# Patient Record
Sex: MALE | Race: WHITE | Employment: UNEMPLOYED | URBAN - METROPOLITAN AREA
[De-identification: names, ages, dates, MRNs, and addresses within clinical notes are randomized per-mention and may not be internally consistent; named-entity substitution may affect disease eponyms.]

---

## 2017-08-21 PROBLEM — R41.82 ALTERED MENTAL STATE: Status: ACTIVE | Noted: 2017-08-20

## 2017-08-21 PROBLEM — F10.21 H/O ALCOHOL DEPENDENCE (HCC): Status: ACTIVE | Noted: 2017-08-20

## 2017-08-21 PROBLEM — T50.901A POLYSUBSTANCE OVERDOSE: Status: ACTIVE | Noted: 2017-08-20

## 2017-08-21 PROBLEM — R79.89 ELEVATED LFTS: Status: ACTIVE | Noted: 2017-08-21

## 2017-08-21 PROBLEM — D69.6 THROMBOCYTOPENIA (HCC): Status: ACTIVE | Noted: 2017-08-20

## 2019-03-22 ENCOUNTER — HOSPITAL ENCOUNTER (OUTPATIENT)
Age: 59
Discharge: HOME | End: 2019-03-24

## 2019-03-22 PROCEDURE — 88305 TISSUE EXAM BY PATHOLOGIST: CPT

## 2019-03-22 PROCEDURE — 87081 CULTURE SCREEN ONLY: CPT

## 2019-03-23 LAB — CLOTEST: NORMAL

## 2019-06-03 ENCOUNTER — OFFICE VISIT (OUTPATIENT)
Dept: PODIATRY | Age: 59
End: 2019-06-03
Payer: MEDICAID

## 2019-06-03 VITALS
TEMPERATURE: 97.8 F | SYSTOLIC BLOOD PRESSURE: 123 MMHG | DIASTOLIC BLOOD PRESSURE: 83 MMHG | WEIGHT: 147 LBS | HEIGHT: 68 IN | BODY MASS INDEX: 22.28 KG/M2

## 2019-06-03 DIAGNOSIS — M79.671 CHRONIC PAIN IN RIGHT FOOT: ICD-10-CM

## 2019-06-03 DIAGNOSIS — M89.8X7 EXOSTOSIS OF BONE OF FOOT: Primary | ICD-10-CM

## 2019-06-03 DIAGNOSIS — M19.079 ARTHRITIS OF FOOT: ICD-10-CM

## 2019-06-03 DIAGNOSIS — M79.672 CHRONIC PAIN IN LEFT FOOT: ICD-10-CM

## 2019-06-03 DIAGNOSIS — G89.29 CHRONIC PAIN IN LEFT FOOT: ICD-10-CM

## 2019-06-03 DIAGNOSIS — G89.29 CHRONIC PAIN IN RIGHT FOOT: ICD-10-CM

## 2019-06-03 PROCEDURE — 20600 DRAIN/INJ JOINT/BURSA W/O US: CPT | Performed by: PODIATRIST

## 2019-06-03 RX ORDER — SOFOSBUVIR, VELPATASVIR, AND VOXILAPREVIR 400; 100; 100 MG/1; MG/1; MG/1
TABLET, FILM COATED ORAL
COMMUNITY
Start: 2019-05-21

## 2019-06-03 RX ORDER — BETAMETHASONE SODIUM PHOSPHATE AND BETAMETHASONE ACETATE 3; 3 MG/ML; MG/ML
4 INJECTION, SUSPENSION INTRA-ARTICULAR; INTRALESIONAL; INTRAMUSCULAR; SOFT TISSUE ONCE
Status: COMPLETED | OUTPATIENT
Start: 2019-06-03 | End: 2019-06-03

## 2019-06-03 RX ORDER — BUPIVACAINE HYDROCHLORIDE 5 MG/ML
1 INJECTION, SOLUTION PERINEURAL ONCE
Status: COMPLETED | OUTPATIENT
Start: 2019-06-03 | End: 2019-06-03

## 2019-06-03 RX ORDER — RIBAVIRIN 200 MG/1
TABLET, FILM COATED ORAL
COMMUNITY
Start: 2019-05-21

## 2019-06-03 RX ADMIN — BUPIVACAINE HYDROCHLORIDE 5 MG: 5 INJECTION, SOLUTION PERINEURAL at 09:30

## 2019-06-03 RX ADMIN — BUPIVACAINE HYDROCHLORIDE 5 MG: 5 INJECTION, SOLUTION PERINEURAL at 09:31

## 2019-06-03 RX ADMIN — BETAMETHASONE SODIUM PHOSPHATE AND BETAMETHASONE ACETATE 4 MG: 3; 3 INJECTION, SUSPENSION INTRA-ARTICULAR; INTRALESIONAL; INTRAMUSCULAR; SOFT TISSUE at 09:30

## 2019-06-03 RX ADMIN — BETAMETHASONE SODIUM PHOSPHATE AND BETAMETHASONE ACETATE 4 MG: 3; 3 INJECTION, SUSPENSION INTRA-ARTICULAR; INTRALESIONAL; INTRAMUSCULAR; SOFT TISSUE at 09:29

## 2019-06-03 NOTE — PROGRESS NOTES
6/3/19  Valerie Manning : 1960 Sex: male  Age: 63 y.o. Patient was referred by Lavelle Ch DO    Chief Complaint   Patient presents with    Foot Pain     pt was last seen in 2019 presents today for injections b/l       HPI: Patient is seen for bilateral foot pain severe arthritic changes are increasing pain has been increasing the only thing that helps or cortisone injections    ROS:  Const: Denies constitutional symptoms  Musculo: Denies symptoms other than stated above  Skin: Denies symptoms other than stated above       Current Outpatient Medications:     diclofenac (VOLTAREN) 50 MG EC tablet, , Disp: , Rfl:     ribavirin (COPEGUS) 200 MG tablet, , Disp: , Rfl:     VOSEVI 400-100-100 MG TABS, , Disp: , Rfl:   No Known Allergies    No past medical history on file. Past Surgical History:   Procedure Laterality Date    FOOT SURGERY       No family history on file.   Social History     Socioeconomic History    Marital status: Single     Spouse name: Not on file    Number of children: Not on file    Years of education: Not on file    Highest education level: Not on file   Occupational History    Not on file   Social Needs    Financial resource strain: Not on file    Food insecurity:     Worry: Not on file     Inability: Not on file    Transportation needs:     Medical: Not on file     Non-medical: Not on file   Tobacco Use    Smoking status: Heavy Tobacco Smoker     Packs/day: 1.50     Types: Cigarettes    Smokeless tobacco: Never Used   Substance and Sexual Activity    Alcohol use: No    Drug use: Yes     Types: IV, Opiates , Marijuana     Comment: last used 3 weeks ago    Sexual activity: Not on file   Lifestyle    Physical activity:     Days per week: Not on file     Minutes per session: Not on file    Stress: Not on file   Relationships    Social connections:     Talks on phone: Not on file     Gets together: Not on file     Attends Scientologist service: Not on file Active member of club or organization: Not on file     Attends meetings of clubs or organizations: Not on file     Relationship status: Not on file    Intimate partner violence:     Fear of current or ex partner: Not on file     Emotionally abused: Not on file     Physically abused: Not on file     Forced sexual activity: Not on file   Other Topics Concern    Not on file   Social History Narrative    Not on file       Vitals:    06/03/19 0852   BP: 123/83   Temp: 97.8 °F (36.6 °C)   TempSrc: Tympanic   Weight: 147 lb (66.7 kg)   Height: 5' 8\" (1.727 m)       Focused Lower Extremity Physical Exam:  Vitals:    06/03/19 0852   BP: 123/83   Temp: 97.8 °F (36.6 °C)        Foot Exam    General  General Appearance: appears stated age and healthy   Orientation: alert and oriented to person, place, and time       Right Foot/Ankle     Inspection and Palpation  Tenderness: bony tenderness, metatarsals and navicular       Left Foot/Ankle      Inspection and Palpation  Tenderness: bony tenderness, metatarsals and navicular              Ortho Exam    there is pain with palpation bilaterally the pain is the base of the 2nd and 3rd metatarsal on the right foot the base of the 1st and 2nd metatarsal left foot there is a very large dorsal exostosis    Vascular: pulses  dp  pt  palpable  Capillary Refill Time:   Hair growth  Skin:    Edema:    Neurologic:      Musculoskeletal/ Orthopedic examination:    NAIL   Web space   Derm:          Assessment and Plan:  Dariela Guevara was seen today for foot pain.     Diagnoses and all orders for this visit:    Exostosis of bone of foot    Arthritis of foot    Chronic pain in right foot    Chronic pain in left foot    Other orders  -     betamethasone acetate-betamethasone sodium phosphate (CELESTONE) injection 4 mg  -     betamethasone acetate-betamethasone sodium phosphate (CELESTONE) injection 4 mg  -     bupivacaine (MARCAINE) 0.5 % injection 5 mg  -     bupivacaine (MARCAINE) 0.5 % injection 5 mg      Bilateral midfoot Injection:  Potential risks, complications, alternative treatment options and procedure prognosis were explained to the patient. Verbal and signed informed consent were obtained from the patient. An antiseptic preparation of the skin was performed with Betadine and alcohol to the Lisfranc joint bilaterally 1 mL of 0.5% Marcaine plain and 1 mL of betamethasone acetate phosphate 6mg  . No adverse reaction was observed. The puncture site was covered with an adhesive bandage and the patient was fitted with a removable metatarsal pad. Post injection instructions were given. Return in about 3 months (around 9/3/2019). Seen By:  Hai Cool DPM      Document was created using voice recognition software. Note was reviewed, however may contain grammatical errors.

## 2019-08-26 ENCOUNTER — OFFICE VISIT (OUTPATIENT)
Dept: PODIATRY | Age: 59
End: 2019-08-26
Payer: MEDICAID

## 2019-08-26 VITALS
BODY MASS INDEX: 21.82 KG/M2 | DIASTOLIC BLOOD PRESSURE: 84 MMHG | WEIGHT: 144 LBS | SYSTOLIC BLOOD PRESSURE: 139 MMHG | TEMPERATURE: 98.3 F | HEIGHT: 68 IN

## 2019-08-26 DIAGNOSIS — G89.29 CHRONIC PAIN IN LEFT FOOT: ICD-10-CM

## 2019-08-26 DIAGNOSIS — G89.29 CHRONIC PAIN IN RIGHT FOOT: ICD-10-CM

## 2019-08-26 DIAGNOSIS — M79.671 CHRONIC PAIN IN RIGHT FOOT: ICD-10-CM

## 2019-08-26 DIAGNOSIS — M25.572 PAIN AND SWELLING OF LEFT ANKLE: Primary | ICD-10-CM

## 2019-08-26 DIAGNOSIS — M79.672 CHRONIC PAIN IN LEFT FOOT: ICD-10-CM

## 2019-08-26 DIAGNOSIS — M25.472 PAIN AND SWELLING OF LEFT ANKLE: Primary | ICD-10-CM

## 2019-08-26 DIAGNOSIS — M19.071 ARTHRITIS OF MIDTARSAL JOINT OF RIGHT FOOT: ICD-10-CM

## 2019-08-26 DIAGNOSIS — M89.8X7 EXOSTOSIS OF BONE OF FOOT: ICD-10-CM

## 2019-08-26 DIAGNOSIS — M19.079 ARTHRITIS OF FOOT: ICD-10-CM

## 2019-08-26 PROCEDURE — 20605 DRAIN/INJ JOINT/BURSA W/O US: CPT | Performed by: PODIATRIST

## 2019-08-26 PROCEDURE — 20600 DRAIN/INJ JOINT/BURSA W/O US: CPT | Performed by: PODIATRIST

## 2019-08-26 PROCEDURE — 99213 OFFICE O/P EST LOW 20 MIN: CPT | Performed by: PODIATRIST

## 2019-08-26 PROCEDURE — L4360 PNEUMAT WALKING BOOT PRE CST: HCPCS | Performed by: PODIATRIST

## 2019-08-26 RX ORDER — BUPIVACAINE HYDROCHLORIDE 5 MG/ML
1 INJECTION, SOLUTION PERINEURAL ONCE
Status: COMPLETED | OUTPATIENT
Start: 2019-08-26 | End: 2019-08-26

## 2019-08-26 RX ORDER — METHYLPREDNISOLONE ACETATE 40 MG/ML
40 INJECTION, SUSPENSION INTRA-ARTICULAR; INTRALESIONAL; INTRAMUSCULAR; SOFT TISSUE ONCE
Status: COMPLETED | OUTPATIENT
Start: 2019-08-26 | End: 2019-08-26

## 2019-08-26 RX ORDER — ETODOLAC 400 MG/1
400 TABLET, FILM COATED ORAL 2 TIMES DAILY
Qty: 180 TABLET | Refills: 1 | Status: SHIPPED | OUTPATIENT
Start: 2019-08-26 | End: 2020-01-01

## 2019-08-26 RX ADMIN — METHYLPREDNISOLONE ACETATE 40 MG: 40 INJECTION, SUSPENSION INTRA-ARTICULAR; INTRALESIONAL; INTRAMUSCULAR; SOFT TISSUE at 09:35

## 2019-08-26 RX ADMIN — BUPIVACAINE HYDROCHLORIDE 5 MG: 5 INJECTION, SOLUTION PERINEURAL at 09:33

## 2019-08-26 RX ADMIN — BUPIVACAINE HYDROCHLORIDE 5 MG: 5 INJECTION, SOLUTION PERINEURAL at 09:34

## 2019-08-26 NOTE — PROGRESS NOTES
file   Relationships    Social connections:     Talks on phone: Not on file     Gets together: Not on file     Attends Confucianist service: Not on file     Active member of club or organization: Not on file     Attends meetings of clubs or organizations: Not on file     Relationship status: Not on file    Intimate partner violence:     Fear of current or ex partner: Not on file     Emotionally abused: Not on file     Physically abused: Not on file     Forced sexual activity: Not on file   Other Topics Concern    Not on file   Social History Narrative    Not on file       Vitals:    08/26/19 0854   BP: 139/84   Temp: 98.3 °F (36.8 °C)   TempSrc: Temporal   Weight: 144 lb (65.3 kg)   Height: 5' 8\" (1.727 m)       Focused Lower Extremity Physical Exam:  Vitals:    08/26/19 0854   BP: 139/84   Temp: 98.3 °F (36.8 °C)        Foot Exam    General  General Appearance: appears stated age and healthy   Orientation: alert and oriented to person, place, and time       Right Foot/Ankle     Inspection and Palpation  Tenderness: calcaneus tenderness, bony tenderness, fifth metatarsal base, great toe interphalangeal joint, great toe metatarsophalangeal joint, lesser metatarsophalangeal joints, metatarsals, navicular, neuroma and plantar fascia   Swelling: dorsum   Skin Exam: skin intact; Neurovascular  Dorsalis pedis: 3+  Posterior tibial: 3+  Saphenous nerve sensation: normal  Tibial nerve sensation: normal  Superficial peroneal nerve sensation: normal  Deep peroneal nerve sensation: normal  Sural nerve sensation: normal      Left Foot/Ankle      Inspection and Palpation  Tenderness: bony tenderness, fifth metatarsal base, great toe interphalangeal joint, great toe metatarsophalangeal joint, lesser metatarsophalangeal joints, metatarsals, neuroma, plantar fascia and calcaneus tenderness   Swelling: dorsum   Skin Exam: skin intact;      Neurovascular  Dorsalis pedis: 3+  Posterior tibial: 3+  Saphenous nerve sensation: foot pain surgical correction is warranted patient is at this time unable to work due to the foot and ankle pain I have advised that his job of a  will only to the pain patient has had multiple x-rays multiple injections which are starting not to work as well and this is directly due to the job I have advised a cam walker to the left foot for 4 weeks surgical correction in the future patient is switched to Lodine instead of Voltaren gel and Voltaren injections were given to the right foot left foot and left ankle again patient was advised to stop working and rest I feel that this is a chronic condition that would only be exasperated by his job  Cam Walker  Signed informed consent was obtained from the patient. Patient applied the cam walker to the affected limb. This device fit well. Patient was given written and verbal instructions regarding this cam walker. This is medically necessary to help reduce pain and promote and expedite healing. Ankle Joint Injection:   Potential risks, complications, alternative treatment options and procedure prognosis were explained to the patient. Verbal and signed informed consent were obtained from the patient. The Left foot was prepped with chlorhexidine. The superficial peroneal nerve was identified and protected laterally and the arthrocentesis was performed through a medial joint line puncture. Injection into the left ankle of 1 cc of 0.5% Marcaine plain 1 cc of Depo-Medrol was injected without difficulty. No adverse reaction was observed and the puncture site was covered with an adhesive bandage. Proper activity modification and icing instructions given. Patient left the office pleased and happy with treatment    Bilateral foot injections midtarsal joint injection:  Potential risks, complications, alternative treatment options and procedure prognosis were explained to the patient. Verbal and signed informed consent were obtained from the patient.   An

## 2019-09-30 ENCOUNTER — OFFICE VISIT (OUTPATIENT)
Dept: PODIATRY | Age: 59
End: 2019-09-30
Payer: MEDICAID

## 2019-09-30 VITALS
DIASTOLIC BLOOD PRESSURE: 74 MMHG | WEIGHT: 144 LBS | SYSTOLIC BLOOD PRESSURE: 122 MMHG | TEMPERATURE: 98.4 F | BODY MASS INDEX: 21.9 KG/M2

## 2019-09-30 DIAGNOSIS — M25.472 PAIN AND SWELLING OF LEFT ANKLE: ICD-10-CM

## 2019-09-30 DIAGNOSIS — G89.29 CHRONIC PAIN IN LEFT FOOT: ICD-10-CM

## 2019-09-30 DIAGNOSIS — G89.29 CHRONIC PAIN IN RIGHT FOOT: Primary | ICD-10-CM

## 2019-09-30 DIAGNOSIS — M19.071 ARTHRITIS OF MIDTARSAL JOINT OF RIGHT FOOT: ICD-10-CM

## 2019-09-30 DIAGNOSIS — M79.672 CHRONIC PAIN IN LEFT FOOT: ICD-10-CM

## 2019-09-30 DIAGNOSIS — M89.8X7 EXOSTOSIS OF BONE OF FOOT: ICD-10-CM

## 2019-09-30 DIAGNOSIS — M25.572 PAIN AND SWELLING OF LEFT ANKLE: ICD-10-CM

## 2019-09-30 DIAGNOSIS — M19.079 ARTHRITIS OF FOOT: ICD-10-CM

## 2019-09-30 DIAGNOSIS — M79.671 CHRONIC PAIN IN RIGHT FOOT: Primary | ICD-10-CM

## 2019-09-30 PROCEDURE — 4004F PT TOBACCO SCREEN RCVD TLK: CPT | Performed by: PODIATRIST

## 2019-09-30 PROCEDURE — 99213 OFFICE O/P EST LOW 20 MIN: CPT | Performed by: PODIATRIST

## 2019-09-30 PROCEDURE — G8420 CALC BMI NORM PARAMETERS: HCPCS | Performed by: PODIATRIST

## 2019-09-30 PROCEDURE — 3017F COLORECTAL CA SCREEN DOC REV: CPT | Performed by: PODIATRIST

## 2019-09-30 PROCEDURE — G8427 DOCREV CUR MEDS BY ELIG CLIN: HCPCS | Performed by: PODIATRIST

## 2020-01-01 ENCOUNTER — OFFICE VISIT (OUTPATIENT)
Dept: PODIATRY | Age: 60
End: 2020-01-01
Payer: MEDICAID

## 2020-01-01 ENCOUNTER — HOSPITAL ENCOUNTER (OUTPATIENT)
Age: 60
Discharge: HOME | End: 2020-09-10

## 2020-01-01 ENCOUNTER — HOSPITAL ENCOUNTER (EMERGENCY)
Age: 60
End: 2020-10-20
Attending: EMERGENCY MEDICINE
Payer: MEDICAID

## 2020-01-01 VITALS — WEIGHT: 143 LBS | HEIGHT: 68 IN | BODY MASS INDEX: 21.67 KG/M2

## 2020-01-01 VITALS
WEIGHT: 143 LBS | TEMPERATURE: 97.6 F | SYSTOLIC BLOOD PRESSURE: 138 MMHG | BODY MASS INDEX: 21.67 KG/M2 | HEIGHT: 68 IN | DIASTOLIC BLOOD PRESSURE: 74 MMHG

## 2020-01-01 VITALS
TEMPERATURE: 97.5 F | HEIGHT: 68 IN | SYSTOLIC BLOOD PRESSURE: 136 MMHG | WEIGHT: 143 LBS | BODY MASS INDEX: 21.67 KG/M2 | DIASTOLIC BLOOD PRESSURE: 84 MMHG

## 2020-01-01 LAB — CLOTEST: NORMAL

## 2020-01-01 PROCEDURE — 99213 OFFICE O/P EST LOW 20 MIN: CPT | Performed by: PODIATRIST

## 2020-01-01 PROCEDURE — 3017F COLORECTAL CA SCREEN DOC REV: CPT | Performed by: PODIATRIST

## 2020-01-01 PROCEDURE — 6360000002 HC RX W HCPCS: Performed by: EMERGENCY MEDICINE

## 2020-01-01 PROCEDURE — 87081 CULTURE SCREEN ONLY: CPT

## 2020-01-01 PROCEDURE — G8484 FLU IMMUNIZE NO ADMIN: HCPCS | Performed by: PODIATRIST

## 2020-01-01 PROCEDURE — 99284 EMERGENCY DEPT VISIT MOD MDM: CPT

## 2020-01-01 PROCEDURE — 92950 HEART/LUNG RESUSCITATION CPR: CPT

## 2020-01-01 PROCEDURE — 4004F PT TOBACCO SCREEN RCVD TLK: CPT | Performed by: PODIATRIST

## 2020-01-01 PROCEDURE — 20605 DRAIN/INJ JOINT/BURSA W/O US: CPT | Performed by: PODIATRIST

## 2020-01-01 PROCEDURE — G8420 CALC BMI NORM PARAMETERS: HCPCS | Performed by: PODIATRIST

## 2020-01-01 PROCEDURE — 6360000002 HC RX W HCPCS

## 2020-01-01 PROCEDURE — 96374 THER/PROPH/DIAG INJ IV PUSH: CPT

## 2020-01-01 PROCEDURE — G8427 DOCREV CUR MEDS BY ELIG CLIN: HCPCS | Performed by: PODIATRIST

## 2020-01-01 PROCEDURE — 2580000003 HC RX 258

## 2020-01-01 PROCEDURE — 2500000003 HC RX 250 WO HCPCS

## 2020-01-01 PROCEDURE — 99283 EMERGENCY DEPT VISIT LOW MDM: CPT

## 2020-01-01 PROCEDURE — 20600 DRAIN/INJ JOINT/BURSA W/O US: CPT | Performed by: PODIATRIST

## 2020-01-01 RX ORDER — METHYLPREDNISOLONE ACETATE 40 MG/ML
40 INJECTION, SUSPENSION INTRA-ARTICULAR; INTRALESIONAL; INTRAMUSCULAR; SOFT TISSUE ONCE
Status: COMPLETED | OUTPATIENT
Start: 2020-01-01 | End: 2020-01-01

## 2020-01-01 RX ORDER — MAGNESIUM SULFATE HEPTAHYDRATE 500 MG/ML
INJECTION, SOLUTION INTRAMUSCULAR; INTRAVENOUS DAILY PRN
Status: COMPLETED | OUTPATIENT
Start: 2020-01-01 | End: 2020-01-01

## 2020-01-01 RX ORDER — EPINEPHRINE 0.1 MG/ML
SYRINGE (ML) INJECTION DAILY PRN
Status: COMPLETED | OUTPATIENT
Start: 2020-01-01 | End: 2020-01-01

## 2020-01-01 RX ORDER — BUPIVACAINE HYDROCHLORIDE 5 MG/ML
1 INJECTION, SOLUTION PERINEURAL ONCE
Status: COMPLETED | OUTPATIENT
Start: 2020-01-01 | End: 2020-01-01

## 2020-01-01 RX ADMIN — MAGNESIUM SULFATE HEPTAHYDRATE 2 G: 500 INJECTION, SOLUTION INTRAMUSCULAR; INTRAVENOUS at 11:51

## 2020-01-01 RX ADMIN — EPINEPHRINE 1 MG: 0.1 INJECTION, SOLUTION ENDOTRACHEAL; INTRACARDIAC; INTRAVENOUS at 11:50

## 2020-01-01 RX ADMIN — METHYLPREDNISOLONE ACETATE 40 MG: 40 INJECTION, SUSPENSION INTRA-ARTICULAR; INTRALESIONAL; INTRAMUSCULAR; SOFT TISSUE at 12:28

## 2020-01-01 RX ADMIN — BUPIVACAINE HYDROCHLORIDE 5 MG: 5 INJECTION, SOLUTION PERINEURAL at 08:42

## 2020-01-01 RX ADMIN — METHYLPREDNISOLONE ACETATE 40 MG: 40 INJECTION, SUSPENSION INTRA-ARTICULAR; INTRALESIONAL; INTRAMUSCULAR; SOFT TISSUE at 08:42

## 2020-01-01 RX ADMIN — BUPIVACAINE HYDROCHLORIDE 5 MG: 5 INJECTION, SOLUTION PERINEURAL at 12:27

## 2020-01-01 RX ADMIN — EPINEPHRINE 1 MG: 0.1 INJECTION, SOLUTION ENDOTRACHEAL; INTRACARDIAC; INTRAVENOUS at 11:53

## 2020-01-01 RX ADMIN — METHYLPREDNISOLONE ACETATE 40 MG: 40 INJECTION, SUSPENSION INTRA-ARTICULAR; INTRALESIONAL; INTRAMUSCULAR; SOFT TISSUE at 08:43

## 2020-01-22 NOTE — PROGRESS NOTES
20  Sandra Block : 1960 Sex: male  Age: 63 y.o. Patient was referred by Lakhwinder Smith DO    Chief Complaint   Patient presents with    Foot Pain     chronic foot pain b/l he states he might need injections today        SUBJECTIVE patient is seen today for chronic bilateral foot pain. Patient is trying to get disability insurance at this time patient is unable to work due to chronic bilateral foot pain as well as back pain  Foot Pain    The pain is present in the right ankle, right foot, left foot and left ankle. This is a chronic problem. The current episode started more than 1 year ago. There has been a history of trauma. The problem occurs constantly. The problem has been rapidly worsening. The pain is at a severity of 10/10. The pain is severe. Associated symptoms include an inability to bear weight, joint locking, joint swelling, a limited range of motion and stiffness. The symptoms are aggravated by standing. He has tried acetaminophen, chondroitin, movement, heat, cold, NSAIDS, OTC ointments, OTC pain meds, oral narcotics and rest for the symptoms. The treatment provided no relief. Family history does not include gout or rheumatoid arthritis. His past medical history is significant for osteoarthritis. There is no history of diabetes, gout or rheumatoid arthritis. Review of Systems   Musculoskeletal: Positive for stiffness. Negative for gout. Const: Denies constitutional symptoms  Musculo: Denies symptoms other than stated above  Skin: Denies symptoms other than stated above       Current Outpatient Medications:     etodolac (LODINE) 400 MG tablet, Take 1 tablet by mouth 2 times daily, Disp: 180 tablet, Rfl: 1    ribavirin (COPEGUS) 200 MG tablet, , Disp: , Rfl:     VOSEVI 400-100-100 MG TABS, , Disp: , Rfl:   No Known Allergies    No past medical history on file.   Past Surgical History:   Procedure Laterality Date    FOOT SURGERY       No family history on file.  Social History     Socioeconomic History    Marital status: Single     Spouse name: Not on file    Number of children: Not on file    Years of education: Not on file    Highest education level: Not on file   Occupational History    Not on file   Social Needs    Financial resource strain: Not on file    Food insecurity:     Worry: Not on file     Inability: Not on file    Transportation needs:     Medical: Not on file     Non-medical: Not on file   Tobacco Use    Smoking status: Heavy Tobacco Smoker     Packs/day: 1.50     Types: Cigarettes    Smokeless tobacco: Never Used   Substance and Sexual Activity    Alcohol use: No    Drug use: Yes     Types: IV, Opiates , Marijuana     Comment: last used 3 weeks ago    Sexual activity: Not on file   Lifestyle    Physical activity:     Days per week: Not on file     Minutes per session: Not on file    Stress: Not on file   Relationships    Social connections:     Talks on phone: Not on file     Gets together: Not on file     Attends Judaism service: Not on file     Active member of club or organization: Not on file     Attends meetings of clubs or organizations: Not on file     Relationship status: Not on file    Intimate partner violence:     Fear of current or ex partner: Not on file     Emotionally abused: Not on file     Physically abused: Not on file     Forced sexual activity: Not on file   Other Topics Concern    Not on file   Social History Narrative    Not on file       Vitals:    01/22/20 0813   BP: 136/84   Temp: 97.5 °F (36.4 °C)   TempSrc: Temporal   Weight: 143 lb (64.9 kg)   Height: 5' 8\" (1.727 m)       Focused Lower Extremity Physical Exam:  Vitals:    01/22/20 0813   BP: 136/84   Temp: 97.5 °F (36.4 °C)        Foot Exam    General  General Appearance: appears stated age and healthy   Orientation: alert and oriented to person, place, and time       Right Foot/Ankle     Inspection and Palpation  Ecchymosis: none  Tenderness: bony tenderness, lesser metatarsophalangeal joints and metatarsals   Swelling: none   Arch: pes cavus  Skin Exam: skin intact; Neurovascular  Dorsalis pedis: 3+  Posterior tibial: 3+  Saphenous nerve sensation: normal  Tibial nerve sensation: normal  Superficial peroneal nerve sensation: normal  Deep peroneal nerve sensation: normal  Sural nerve sensation: normal  Achilles reflex: 2+  Babinski reflex: 2+    Muscle Strength  Ankle dorsiflexion: 5  Ankle plantar flexion: 5  Ankle inversion: 5  Ankle eversion: 5  Great toe extension: 5  Great toe flexion: 5    Range of Motion    Passive  Ankle dorsiflexion: pain  Ankle plantar flexion: pain  Ankle eversion: pain  Ankle inversion: pain        Left Foot/Ankle      Inspection and Palpation  Ecchymosis: none  Tenderness: lesser metatarsophalangeal joints and metatarsals   Swelling: none   Arch: pes cavus  Skin Exam: skin intact; Neurovascular  Dorsalis pedis: 3+  Posterior tibial: 3+  Saphenous nerve sensation: normal  Tibial nerve sensation: normal  Superficial peroneal nerve sensation: normal  Deep peroneal nerve sensation: normal  Sural nerve sensation: normal  Achilles reflex: 2+  Babinski reflex: 2+    Muscle Strength  Ankle dorsiflexion: 5  Ankle plantar flexion: 5  Ankle inversion: 5  Ankle eversion: 5  Great toe extension: 5  Great toe flexion: 5    Range of Motion    Passive  Ankle dorsiflexion: pain  Plantar flexion: pain  Ankle eversion: pain  Ankle inversion: pain               Right Ankle Exam     Muscle Strength   Dorsiflexion:  5/5  Plantar flexion:  5/5      Left Ankle Exam     Muscle Strength   The patient has normal left ankle strength.   Dorsiflexion:  5/5   Plantar flexion:  5/5             Vascular: pulses  dp  pt    Capillary Refill Time:   Hair growth  Skin:    Edema:    Neurologic:      Musculoskeletal/ Orthopedic examination: X-ray reviewed again today he has in the left talonavicular joint is a screw the screw has loosened the large dorsal lipping bilateral TM joints are completely destroyed with multiple osteophytes there is an osteophyte noted at the base of the metatarsal cuneiform joint there is pain with palpation and range of motion of the entire left and right foot  NAIL  Web space   Derm:          Assessment and Plan: This time I filled out paperwork for the patient I do feel this is an issue bilateral foot that would allow the patient never to work again or stand the arthritis due to the accident that was many years ago has precluded him from even walking or standing more than 10 minutes patient will need to undergo surgical correction this would be a fusion of the TM joint in the future bilaterally  Jerel was seen today for foot pain. 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 Verbal and signed informed consent were obtained from the patient. An antiseptic preparation of the skin was performed with ChloraPrep 1 cc of 25% Marcaine plain 2 cc of Depo-Medrol injected into the left and right foot tarsal metatarsal joint no adverse reaction was observed. The puncture site was covered with an adhesive bandage and the patient was fitted with a removable metatarsal pad. Post injection instructions were given. Return in about 3 months (around 4/22/2020). Seen By:  Donavan King DPM      Document was created using voice recognition software. Note was reviewed, however may contain grammatical errors.

## 2020-06-08 NOTE — PROGRESS NOTES
bony tenderness, lesser metatarsophalangeal joints and metatarsals   Swelling: none   Arch: pes cavus  Skin Exam: skin intact; Neurovascular  Dorsalis pedis: 3+  Posterior tibial: 3+  Saphenous nerve sensation: normal  Tibial nerve sensation: normal  Superficial peroneal nerve sensation: normal  Deep peroneal nerve sensation: normal  Sural nerve sensation: normal  Achilles reflex: 2+  Babinski reflex: 2+    Muscle Strength  Ankle dorsiflexion: 5  Ankle plantar flexion: 5  Ankle inversion: 5  Ankle eversion: 5  Great toe extension: 5  Great toe flexion: 5    Range of Motion    Passive  Ankle dorsiflexion: pain  Ankle plantar flexion: pain  Ankle eversion: pain  Ankle inversion: pain        Left Foot/Ankle      Inspection and Palpation  Ecchymosis: none  Tenderness: lesser metatarsophalangeal joints and metatarsals   Swelling: none   Arch: pes cavus  Skin Exam: skin intact; Neurovascular  Dorsalis pedis: 3+  Posterior tibial: 3+  Saphenous nerve sensation: normal  Tibial nerve sensation: normal  Superficial peroneal nerve sensation: normal  Deep peroneal nerve sensation: normal  Sural nerve sensation: normal  Achilles reflex: 2+  Babinski reflex: 2+    Muscle Strength  Ankle dorsiflexion: 5  Ankle plantar flexion: 5  Ankle inversion: 5  Ankle eversion: 5  Great toe extension: 5  Great toe flexion: 5    Range of Motion    Passive  Ankle dorsiflexion: pain  Plantar flexion: pain  Ankle eversion: pain  Ankle inversion: pain               Right Ankle Exam     Muscle Strength   Dorsiflexion:  5/5  Plantar flexion:  5/5      Left Ankle Exam     Muscle Strength   The patient has normal left ankle strength.   Dorsiflexion:  5/5   Plantar flexion:  5/5             Vascular: pulses  dp  pt    Capillary Refill Time:   Hair growth  Skin:    Edema:    Neurologic:      Musculoskeletal/ Orthopedic examination: X-ray reviewed again today he has in the left talonavicular joint is a screw the screw has loosened the large dorsal

## 2020-10-15 NOTE — ED NOTES
Bed: 16  Expected date:   Expected time:   Means of arrival:   Comments:  Cardiac arrest     Dasia Latham RN  10/15/20 1142

## 2020-10-15 NOTE — ED NOTES
This note is from Yonatan Albarran. Unable to reach PCP Antonia Comment.  called [phone], spoke with Telma Click. Information given on patient. Patient to [facility] at this time.       Surekha Alberts, RN  10/15/20 [address]  Gm, RN  10/15/20 2324

## 2020-10-15 NOTE — ED PROVIDER NOTES
Department of Emergency Medicine   ED  Provider Note  Admit Date/RoomTime: 10/15/2020 11:45 AM  ED Room: CORRINE/CORRINE          History of Present Illness:  10/15/20, Time: 11:57 AM EDT  Chief Complaint   Patient presents with   Yunior Jones is a 62 y.o. male presenting to the ED for cardiac arrest, beginning approximately 1 hour ago. The complaint has been persistent, severe in severity, and worsened by nothing. According to patient's friend the patient called him this morning and stated that he felt like he was having a heart attack. Patient's friend stated that he could tell the patient was not feeling well by the way he was speaking. 911 was called and upon EMS arrival patient was unresponsive. Per police patient was unresponsive upon their arrival but breathing. Patient was in cardiorespiratory arrest for EMS. ACLS was initiated, stating that there were brief episodes of return of circulation, ventricular rhythm as well as PEA, most recently asystole. Patient has received epinephrine in route. Review of Systems:   Unable to obtain due to patient condition        --------------------------------------------- PAST HISTORY ---------------------------------------------  Past Medical History:  has a past medical history of Cirrhosis ([facility] [address] and Hepatitis. Past Surgical History:  has a past surgical history that includes Foot surgery. Social History:  reports that he has been smoking cigarettes. He has been smoking about 1.50 packs per day. He has never used smokeless tobacco. He reports current drug use. Drugs: IV, Opiates , and Marijuana. He reports that he does not drink alcohol. Family History: family history is not on file. . Unless otherwise noted, family history is non contributory    The patients home medications have been reviewed. Allergies: Patient has no known allergies.         ---------------------------------------------------PHYSICAL EXAM--------------------------------------    Constitutional/General: Unresponsive, pulseless, apneic  Head: Normocephalic and atraumatic  Eyes: Pupils fixed and dilated  Respiratory: Apneic respiration  Cardiovascular: Pulseless, asystole on monitor  Chest: No chest wall trauma  GI:  Abdomen Soft, Non distended. Integument: skin pale and cool  Neurologic: GCS 3T            -------------------------------------------------- RESULTS -------------------------------------------------  I have personally reviewed all laboratory and imaging results for this patient. Results are listed below. LABS: (Lab results interpreted by me)  No results found for this visit on 10/15/20.,       RADIOLOGY:  Interpreted by Radiologist unless otherwise specified  No orders to display             ------------------------- NURSING NOTES AND VITALS REVIEWED ---------------------------   The nursing notes within the ED encounter and vital signs as below have been reviewed by myself  BP (!) 0/0   Pulse (!) 0   Resp (!) 0   Ht 5' 8\" (1.727 m)   Wt 143 lb (64.9 kg)   SpO2 (!) 0%   BMI 21.74 kg/m²     Oxygen Saturation Interpretation: Abnormal    The patients available past medical records and past encounters were reviewed. ------------------------------ ED COURSE/MEDICAL DECISION MAKING----------------------  Medications   EPINEPHrine 1 MG/10ML injection (1 mg Intravenous Given 10/15/20 1153)   magnesium sulfate injection (2 g Intravenous Given 10/15/20 1151)           The cardiac monitor revealed a systolic rhythm with a heart rate in the 00s as interpreted by me. The cardiac monitor was ordered secondary to the patient's chest pain and to monitor for patient for dysrhythmia. CPT 89215           Medical Decision Making:     I, Dr. Shan Steward, am the primary provider of record    76-year-old male presenting in cardiac arrest.  Patient apparently called his friend stating that he felt like he was having a heart attack. By the time first responders arrived patient was unresponsive. He had continuous CPR with ACLS, airway obtained with Carloia March airway, good ventilation. Patient arrived in asystole but did have a ventricular rhythm with 3 defibrillations early during course of resuscitation. Epinephrine was continued, patient was also given sodium bicarbonate as well as amiodarone. Patient had total of 3 epinephrine, 1 sodium bicarb, 300 mg amiodarone and 2 g of magnesium sulfate. Continued resuscitative efforts, approximately 50 minutes since ACLS was initiated with EMS. There is no return of circulation, remaining in asystole. Point-of-care bedside ultrasound shows no heart wall motion. Efforts were terminated, time of death . Patient's friend arrived and was notified of death. He states that patient has no family, mother and brother have also passed away. Further arrangements are being coordinated, unable to make contact with PCP,  has accepted the patient. Re-Evaluations: This patient's ED course included: a personal history and physicial examination, re-evaluation prior to disposition, multiple bedside re-evaluations, IV medications, cardiac monitoring, continuous pulse oximetry and complex medical decision making and emergency management    This patient has deteriorated during their ED course.           --------------------------------- IMPRESSION AND DISPOSITION ---------------------------------    IMPRESSION  1. Cardiac arrest [facility])        DISPOSITION  Disposition:   Patient condition is         NOTE: This report was transcribed using voice recognition software.  Every effort was made to ensure accuracy; however, inadvertent computerized transcription errors may be present        Enrigue Sample, DO  10/15/20 [id number]

## 2020-10-19 NOTE — ED NOTES
This nurse spoke with several people by phone to arrive at the [employer] phone number, [phone]. This nurse called Gavin Julian received voice mail with a mail box that was full.       Areli Tsai RN  10/19/20 [id number]

## 2020-10-19 NOTE — ED NOTES
This nurse spoke with Cinthya Huston from coroners office was informed that pt. Will be picked up by  home when family makes plans.      Madhu Flowers RN  10/19/20 [id number]

## 2020-10-20 NOTE — ED NOTES
Gavin Julian called in and is requesting the services of [address] [place] [address] Rd., [address] [facility], [address] [place] [address]  14/85/97 [id number]

## 2020-10-20 NOTE — ED NOTES
Sherly Blevins notified of , his office will collect 10-20-20 in the am sometime     Theta Brooms, [place]  66/79/95 0429